# Patient Record
Sex: FEMALE | Race: WHITE | NOT HISPANIC OR LATINO
[De-identification: names, ages, dates, MRNs, and addresses within clinical notes are randomized per-mention and may not be internally consistent; named-entity substitution may affect disease eponyms.]

---

## 2023-05-03 ENCOUNTER — NON-APPOINTMENT (OUTPATIENT)
Age: 38
End: 2023-05-03

## 2023-05-03 PROBLEM — Z00.00 ENCOUNTER FOR PREVENTIVE HEALTH EXAMINATION: Status: ACTIVE | Noted: 2023-05-03

## 2023-05-09 ENCOUNTER — TRANSCRIPTION ENCOUNTER (OUTPATIENT)
Age: 38
End: 2023-05-09

## 2023-05-17 DIAGNOSIS — Z86.79 PERSONAL HISTORY OF OTHER DISEASES OF THE CIRCULATORY SYSTEM: ICD-10-CM

## 2023-05-23 ENCOUNTER — NON-APPOINTMENT (OUTPATIENT)
Age: 38
End: 2023-05-23

## 2023-05-23 ENCOUNTER — APPOINTMENT (OUTPATIENT)
Dept: ELECTROPHYSIOLOGY | Facility: CLINIC | Age: 38
End: 2023-05-23
Payer: COMMERCIAL

## 2023-05-23 VITALS
BODY MASS INDEX: 34.15 KG/M2 | OXYGEN SATURATION: 98 % | SYSTOLIC BLOOD PRESSURE: 111 MMHG | HEIGHT: 64 IN | WEIGHT: 200 LBS | HEART RATE: 60 BPM | DIASTOLIC BLOOD PRESSURE: 74 MMHG

## 2023-05-23 DIAGNOSIS — R00.2 PALPITATIONS: ICD-10-CM

## 2023-05-23 PROCEDURE — 99204 OFFICE O/P NEW MOD 45 MIN: CPT | Mod: 25

## 2023-05-23 PROCEDURE — 93000 ELECTROCARDIOGRAM COMPLETE: CPT

## 2023-05-23 RX ORDER — ADHESIVE TAPE 3"X 2.3 YD
50 MCG TAPE, NON-MEDICATED TOPICAL
Refills: 0 | Status: ACTIVE | COMMUNITY
Start: 2023-05-23

## 2023-05-23 RX ORDER — BISOPROLOL FUMARATE 5 MG/1
5 TABLET, FILM COATED ORAL
Refills: 0 | Status: ACTIVE | COMMUNITY
Start: 2023-05-23

## 2023-05-23 RX ORDER — MAGNESIUM GLYCINATE 100 MG
100 CAPSULE ORAL
Refills: 0 | Status: ACTIVE | COMMUNITY
Start: 2023-05-23

## 2023-05-23 NOTE — DISCUSSION/SUMMARY
[EKG obtained to assist in diagnosis and management of assessed problem(s)] : EKG obtained to assist in diagnosis and management of assessed problem(s) [FreeTextEntry1] : Myriam Valdes is a 38 year old woman SVT and HTN who presents for an evaluation of her palpitations.We will order her for a 1 week event monitor to assess her overall arrhythmia burden. We also discussed lifestyle modifications such as hydration, yoga/meditation, exercise, avoiding alcohol/caffeine to help prevent episodes. We will reach out to her with the results of her monitor and determine next steps at that time. The plan was explained in detail to the patient, and she agreed to proceed. All concerns were addressed during the visit. She will reach out with any further questions.

## 2023-05-23 NOTE — PHYSICAL EXAM
[Soft] : abdomen soft [Non Tender] : non-tender [No Edema] : no edema [Normal] : alert and oriented, normal memory

## 2023-05-23 NOTE — REVIEW OF SYSTEMS
[SOB] : shortness of breath [Palpitations] : palpitations [Weakness] : weakness [Fever] : no fever [Chills] : no chills [Chest Discomfort] : no chest discomfort [Abdominal Pain] : no abdominal pain

## 2023-05-23 NOTE — HISTORY OF PRESENT ILLNESS
[FreeTextEntry1] : Myriam Valdes is a 38 year old woman SVT and HTN who presents for an evaluation of her SVT. She was first diagnosed with SVT in 2019 after undergoing evaluation for palpitations with her cardiologist. She was started on bisoprolol which has been uptitrated over time due to her continued symptoms. She feels her palpitations have become more frequent in the last six months. Her palpitations last anywhere from minutes to hours and can happen multiple times in the week. Her symptoms are associated with dyspnea and weakness. She notes her episodes sometimes correlate with her hormonal cycle, as well as stress and pain. She avoids caffeine and alcohol in case they are triggers for her symptoms. She denies chest pain, SOB, lower extremity edema, syncope. \par \par Today in clinic her vitals are HR -  60, BP - 111/74. Her ECG shows sinus bradycardia at 55 bpm. \par \par

## 2023-05-23 NOTE — CARDIOLOGY SUMMARY
[de-identified] : 5/23/23: sinus bradycardia [de-identified] : Event monitor 4/9/21-5/9/21: sinus rhythm, 6 episodes of paroxysmal SVT with max duration of 16 beats, fastest lasting 4 seconds at 165 bpm. Patient triggered events correlated with SVE and sinus.  [de-identified] : Exercise EKG stress test 4/23/21: negative for ischemia, excellent exercise capacity. 9 minutes 44 seconds of exercise, 11.5 METS.

## 2023-05-23 NOTE — END OF VISIT
[FreeTextEntry3] : I, Dr. Knott, personally performed the evaluation and management (E/M) services for this new patient. That E/M includes conducting the initial examination, assessing all conditions, and establishing the plan of care. Today, my fellow, Duarte Milian, was here to observe my evaluation and management services for this patient to be followed going forward.

## 2023-06-12 ENCOUNTER — NON-APPOINTMENT (OUTPATIENT)
Age: 38
End: 2023-06-12

## 2023-06-12 PROCEDURE — 93244 EXT ECG>48HR<7D REV&INTERPJ: CPT

## 2023-09-05 ENCOUNTER — RX RENEWAL (OUTPATIENT)
Age: 38
End: 2023-09-05

## 2023-09-27 ENCOUNTER — APPOINTMENT (OUTPATIENT)
Dept: ELECTROPHYSIOLOGY | Facility: CLINIC | Age: 38
End: 2023-09-27
Payer: COMMERCIAL

## 2023-09-27 PROCEDURE — 99213 OFFICE O/P EST LOW 20 MIN: CPT | Mod: 95

## 2024-06-03 ENCOUNTER — RX RENEWAL (OUTPATIENT)
Age: 39
End: 2024-06-03

## 2024-06-03 RX ORDER — FLECAINIDE ACETATE 50 MG/1
50 TABLET ORAL TWICE DAILY
Qty: 60 | Refills: 1 | Status: ACTIVE | COMMUNITY
Start: 2023-07-10 | End: 1900-01-01

## 2024-07-31 ENCOUNTER — RX RENEWAL (OUTPATIENT)
Age: 39
End: 2024-07-31

## 2024-09-30 ENCOUNTER — RX RENEWAL (OUTPATIENT)
Age: 39
End: 2024-09-30

## 2024-10-09 ENCOUNTER — OFFICE (OUTPATIENT)
Dept: URBAN - METROPOLITAN AREA CLINIC 113 | Facility: CLINIC | Age: 39
Setting detail: OPHTHALMOLOGY
End: 2024-10-09
Payer: COMMERCIAL

## 2024-10-09 DIAGNOSIS — H01.001: ICD-10-CM

## 2024-10-09 DIAGNOSIS — H00.14: ICD-10-CM

## 2024-10-09 DIAGNOSIS — H01.004: ICD-10-CM

## 2024-10-09 PROCEDURE — 99213 OFFICE O/P EST LOW 20 MIN: CPT | Performed by: STUDENT IN AN ORGANIZED HEALTH CARE EDUCATION/TRAINING PROGRAM

## 2024-10-09 ASSESSMENT — VISUAL ACUITY
OD_BCVA: 20/20-
OS_BCVA: 20/25-

## 2024-10-09 ASSESSMENT — KERATOMETRY
OD_AXISANGLE_DEGREES: 060
OS_K1POWER_DIOPTERS: 44.50
OS_AXISANGLE_DEGREES: 148
OS_K2POWER_DIOPTERS: 45.00
OD_K2POWER_DIOPTERS: 45.00
OD_K1POWER_DIOPTERS: 44.50
METHOD_AUTO_MANUAL: AUTO

## 2024-10-09 ASSESSMENT — REFRACTION_AUTOREFRACTION
OS_SPHERE: +0.25
OD_SPHERE: -0.25
OS_CYLINDER: -1.25
OD_CYLINDER: -0.75
OD_AXIS: 097
OS_AXIS: 073

## 2024-10-09 ASSESSMENT — LID EXAM ASSESSMENTS
OS_MEIBOMITIS: LUL T
OD_BLEPHARITIS: RUL 1+
OS_BLEPHARITIS: LUL 1+
OS_EDEMA: LUL 1+
OD_MEIBOMITIS: RUL T

## 2024-10-09 ASSESSMENT — CONFRONTATIONAL VISUAL FIELD TEST (CVF)
OD_FINDINGS: FULL
OS_FINDINGS: FULL

## 2024-10-09 ASSESSMENT — TONOMETRY
OD_IOP_MMHG: 19
OS_IOP_MMHG: 17